# Patient Record
Sex: FEMALE | Race: WHITE | NOT HISPANIC OR LATINO | Employment: FULL TIME | URBAN - METROPOLITAN AREA
[De-identification: names, ages, dates, MRNs, and addresses within clinical notes are randomized per-mention and may not be internally consistent; named-entity substitution may affect disease eponyms.]

---

## 2017-01-05 ENCOUNTER — HOSPITAL ENCOUNTER (EMERGENCY)
Facility: MEDICAL CENTER | Age: 52
End: 2017-01-05
Attending: EMERGENCY MEDICINE
Payer: OTHER MISCELLANEOUS

## 2017-01-05 ENCOUNTER — APPOINTMENT (OUTPATIENT)
Dept: RADIOLOGY | Facility: MEDICAL CENTER | Age: 52
End: 2017-01-05
Attending: EMERGENCY MEDICINE
Payer: OTHER MISCELLANEOUS

## 2017-01-05 VITALS
SYSTOLIC BLOOD PRESSURE: 145 MMHG | HEART RATE: 91 BPM | TEMPERATURE: 97.4 F | WEIGHT: 152.56 LBS | RESPIRATION RATE: 16 BRPM | OXYGEN SATURATION: 97 % | DIASTOLIC BLOOD PRESSURE: 89 MMHG

## 2017-01-05 DIAGNOSIS — S20.211A CHEST WALL CONTUSION, RIGHT, INITIAL ENCOUNTER: ICD-10-CM

## 2017-01-05 PROCEDURE — 700111 HCHG RX REV CODE 636 W/ 250 OVERRIDE (IP): Performed by: EMERGENCY MEDICINE

## 2017-01-05 PROCEDURE — 96372 THER/PROPH/DIAG INJ SC/IM: CPT

## 2017-01-05 PROCEDURE — 99284 EMERGENCY DEPT VISIT MOD MDM: CPT

## 2017-01-05 PROCEDURE — 71020 DX-CHEST-2 VIEWS: CPT

## 2017-01-05 RX ORDER — KETOROLAC TROMETHAMINE 30 MG/ML
30 INJECTION, SOLUTION INTRAMUSCULAR; INTRAVENOUS ONCE
Status: COMPLETED | OUTPATIENT
Start: 2017-01-05 | End: 2017-01-05

## 2017-01-05 RX ORDER — TRAMADOL HYDROCHLORIDE 50 MG/1
50 TABLET ORAL EVERY 4 HOURS PRN
Qty: 20 TAB | Refills: 0 | Status: SHIPPED | OUTPATIENT
Start: 2017-01-05

## 2017-01-05 RX ADMIN — KETOROLAC TROMETHAMINE 30 MG: 30 INJECTION, SOLUTION INTRAMUSCULAR; INTRAVENOUS at 12:49

## 2017-01-05 NOTE — ED AVS SNAPSHOT
1/5/2017          Bridgette Franco  [address] [address]    Dear Bridgette:    [facility] wants to ensure your discharge home is safe and you or your loved ones have had all your questions answered regarding your care after you leave the hospital.    You may receive a telephone call within two days of your discharge.  This call is to make certain you understand your discharge instructions as well as ensure we provided you with the best care possible during your stay with us.     The call will only last approximately 3-5 minutes and will be done by a nurse.    Once again, we want to ensure your discharge home is safe and that you have a clear understanding of any next steps in your care.  If you have any questions or concerns, please do not hesitate to contact us, we are here for you.  Thank you for choosing [facility] for your healthcare needs.    Sincerely,    Bhavesh Graff    [facility]

## 2017-01-05 NOTE — ED NOTES
Discharge instructions given. Verbalizes understanding. Left with all belongings. Walked to ALDO lopez.

## 2017-01-05 NOTE — ED NOTES
Pt reports fall on Monday. Pt reports continued soreness to right sided ribs. Pt reports her right ankle had given out. Pt denies taking any medications. Pt in NAD. VSS

## 2017-01-05 NOTE — ED AVS SNAPSHOT
Tackle Grab Access Code: [email or web address]  Expires: 2/4/2017  1:42 PM    Tackle Grab  A secure, online tool to manage your health information     [employer]’s Tackle Grab® is a secure, online tool that connects you to your personalized health information from the privacy of your home -- day or night - making it very easy for you to manage your healthcare. Once the activation process is completed, you can even access your medical information using the Tackle Grab oliva, which is available for free in the Apple Oliva store or Google Play store.     Tackle Grab provides the following levels of access (as shown below):   My Chart Features   [facility] Primary Care Doctor [facility]  Specialists [facility]  Urgent  Care Non-[facility]  Primary Care  Doctor   Email your healthcare team securely and privately 24/7 X X X X   Manage appointments: schedule your next appointment; view details of past/upcoming appointments X      Request prescription refills. X      View recent personal medical records, including lab and immunizations X X X X   View health record, including health history, allergies, medications X X X X   Read reports about your outpatient visits, procedures, consult and ER notes X X X X   See your discharge summary, which is a recap of your hospital and/or ER visit that includes your diagnosis, lab results, and care plan. X X       How to register for Tackle Grab:  1. Go to  [email or web address].  2. Click on the Sign Up Now box, which takes you to the New Member Sign Up page. You will need to provide the following information:  a. Enter your Tackle Grab Access Code exactly as it appears at the top of this page. (You will not need to use this code after you’ve completed the sign-up process. If you do not sign up before the expiration date, you must request a new code.)   b. Enter your date of birth.   c. Enter your home email address.   d. Click Submit, and follow the next screen’s instructions.  3. Create a Tackle Grab ID. This will be your Tackle Grab  login ID and cannot be changed, so think of one that is secure and easy to remember.  4. Create a Lollipuff password. You can change your password at any time.  5. Enter your Password Reset Question and Answer. This can be used at a later time if you forget your password.   6. Enter your e-mail address. This allows you to receive e-mail notifications when new information is available in Lollipuff.  7. Click Sign Up. You can now view your health information.    For assistance activating your Lollipuff account, call [phone]

## 2017-01-05 NOTE — ED PROVIDER NOTES
ED Provider Note    Scribed for Domingo Villarreal M.D. by Loree Damon. 1/5/2017  12:23 PM    Primary care provider: No primary care provider  Means of arrival: Private vehicle  History obtained from: Patient  History limited by: None    CHIEF COMPLAINT  Chief Complaint   Patient presents with   • T-5000 FALL     HPI  Bridgette Franco is a 51 y.o. female who presents to the Emergency Department for right sided rib pain which occurred on 1/2 after the patient's ankle gave out on her and she fell on the curb.  The patient states that her ribs hurt below her right breast and the pain worsens with deep inspiration and expiration.  The patient states that the pain is so great it causes her to not be able to smoke her normal amount of cigarettes a day. The patient denies any difficulty walking or moving. The patient denies abdominal pain.     REVIEW OF SYSTEMS  Pertinent negatives include no abdominal pain.      PAST MEDICAL HISTORY    No chronic medical problems    SURGICAL HISTORY  patient denies any surgical history    SOCIAL HISTORY  Social History   Substance Use Topics   • Smoking status: Half a pack    • Smokeless tobacco: No   • Alcohol Use: No      History   Drug Use No     FAMILY HISTORY  No family history noted    CURRENT MEDICATIONS  No current facility-administered medications on file prior to encounter.     No current outpatient prescriptions on file prior to encounter.     ALLERGIES  No Known Allergies    PHYSICAL EXAM  VITAL SIGNS: /98 mmHg  Pulse 95  Temp(Src) 36.3 °C (97.4 °F)  Resp 16  Wt 69.2 kg (152 lb 8.9 oz)  SpO2 95%    Constitutional: Well developed, Well nourished, No acute distress, Non-toxic appearance. Tearful   HENT: Normocephalic, Atraumatic, Bilateral external ears normal, Oropharynx is clear mucous membranes are moist. No oral exudates or nasal discharge.   Eyes: Pupils are equal round and reactive, EOMI, Conjunctiva normal, No discharge.   Neck: Normal range of  motion, No tenderness, Supple, No stridor. No meningismus.  Lymphatic: No lymphadenopathy noted.   Cardiovascular: Regular rate and rhythm without murmur rub or gallop.  Thorax & Lungs: Clear breath sounds bilaterally without wheezes, rhonchi or rales. Tenderness right sided T7 and T8 ribs from anterior axillary line to the sternum   Abdomen: Soft non-tender non-distended. There is no rebound or guarding. No organomegaly is appreciated. Bowel sounds are normal.  Skin: Normal without rash.  Abrasions to the right knee  Back: No CVA or spinal tenderness.   Extremities: Intact distal pulses, No edema, No tenderness, No cyanosis, No clubbing. Capillary refill is less than 2 seconds.  Musculoskeletal: Good range of motion in all major joints. No tenderness to palpation or major deformities noted.   Neurologic: Alert & oriented x 3, Normal motor function, Normal sensory function, No focal deficits noted. Reflexes are normal.  Psychiatric: Affect normal, Judgment normal, Mood normal. There is no suicidal ideation or patient reported hallucinations.    RADIOLOGY  DX-CHEST-2 VIEWS   Final Result      No active cardiopulmonary disease.        The radiologist's interpretation of all radiological studies have been reviewed by me.    COURSE & MEDICAL DECISION MAKING  Nursing notes, VS, PMSFHx reviewed in chart.    12:23 PM Patient seen and examined at bedside. Ordered for chest xray to evaluate. I was concerned about pneumothorax versus chest contusion versus rib fractures. Patient was treated with Toradol 30mg injection for her symptoms. I spent approximately five minutes with the patient explaining the importance of smoking cessation.      1:30 PM- Re-examined; The patient is resting in bed comfortably. I discussed her above findings and plans for discharge with a prescription for Ultram to be used as needed for discomfort along with ibuprofen. She was given a referral to follow up with her primary care provider and instructed  to return to the ED if her symptoms worsen. Patient understands and agrees. Her vitals prior to discharge are: /98 mmHg  Pulse 95  Temp(Src) 36.3 °C (97.4 °F)  Resp 16  Wt 69.2 kg (152 lb 8.9 oz)  SpO2 95%    Patient has had high blood pressure while in the emergency department, felt likely secondary to medical condition. Counseled patient to monitor blood pressure at home and follow up with primary care physician.      I have signed into and reviewed the patient's prescription monitoring program data prior to prescribing a scheduled drug. The patient will not drink alcohol nor drive with prescribed medications. The patient will return for new or worsening symptoms and is stable at the time of discharge.    DISPOSITION:  Patient will be discharged home in stable condition.    FINAL IMPRESSION  1. Chest wall contusion, right, initial encounter          Loree SMITH (Andrew), am scribing for, and in the presence of, Domingo Villarreal M.D..    Electronically signed by: Loree Carlos), 1/5/2017    IDomingo M.D. personally performed the services described in this documentation, as scribed by Loree Damon in my presence, and it is both accurate and complete.    The note accurately reflects work and decisions made by me.  Domingo Villarreal  1/5/2017  1:39 PM

## 2017-01-05 NOTE — ED AVS SNAPSHOT
After Visit Summary                                                                                                                Bridgette Franco   MRN: [id number]    Department:  [facility], Emergency Dept   Date of Visit:  1/5/2017            [facility], Emergency Dept    [address]    [place] [address]    Phone:  [phone]      You were seen by     Domingo Villarreal M.D.      Your Diagnosis Was     Chest wall contusion, right, initial encounter     S20.211A       These are the medications you received during your hospitalization from 01/05/2017 1051 to 01/05/2017 1343     Date/Time Order Dose Route Action    01/05/2017 1249 ketorolac (TORADOL) injection 30 mg 30 mg Intramuscular Given      Medication Information     Review all of your home medications and newly ordered medications with your primary doctor and/or pharmacist as soon as possible. Follow medication instructions as directed by your doctor and/or pharmacist.     Please keep your complete medication list with you and share with your physician. Update the information when medications are discontinued, doses are changed, or new medications (including over-the-counter products) are added; and carry medication information at all times in the event of emergency situations.               Medication List      START taking these medications        Instructions    tramadol 50 MG Tabs   Commonly known as:  ULTRAM    Take 1 Tab by mouth every four hours as needed.   Dose:  50 mg               Procedures and tests performed during your visit     DX-CHEST-2 VIEWS        Discharge Instructions       Blunt Chest Trauma  Blunt chest trauma is an injury caused by a blow to the chest. These chest injuries can be very painful. Blunt chest trauma often results in bruised or broken (fractured) ribs. Most cases of bruised and fractured ribs from blunt chest traumas get better after 1 to 3 weeks of rest and pain medicine. Often,  the soft tissue in the chest wall is also injured, causing pain and bruising. Internal organs, such as the heart and lungs, may also be injured. Blunt chest trauma can lead to serious medical problems. This injury requires immediate medical care.  CAUSES   · Motor vehicle collisions.  · Falls.  · Physical violence.  · Sports injuries.  SYMPTOMS   · Chest pain. The pain may be worse when you move or breathe deeply.  · Shortness of breath.  · Lightheadedness.  · Bruising.  · Tenderness.  · Swelling.  DIAGNOSIS   Your caregiver will do a physical exam. X-rays may be taken to look for fractures. However, minor rib fractures may not show up on X-rays until a few days after the injury. If a more serious injury is suspected, further imaging tests may be done. This may include ultrasounds, computed tomography (CT) scans, or magnetic resonance imaging (MRI).  TREATMENT   Treatment depends on the severity of your injury. Your caregiver may prescribe pain medicines and deep breathing exercises.  HOME CARE INSTRUCTIONS  · Limit your activities until you can move around without much pain.  · Do not do any strenuous work until your injury is healed.  · Put ice on the injured area.  ¨ Put ice in a plastic bag.  ¨ Place a towel between your skin and the bag.  ¨ Leave the ice on for 15-20 minutes, 03-04 times a day.  · You may wear a rib belt as directed by your caregiver to reduce pain.  · Practice deep breathing as directed by your caregiver to keep your lungs clear.  · Only take over-the-counter or prescription medicines for pain, fever, or discomfort as directed by your caregiver.  SEEK IMMEDIATE MEDICAL CARE IF:   · You have increasing pain or shortness of breath.  · You cough up blood.  · You have nausea, vomiting, or abdominal pain.  · You have a fever.  · You feel dizzy, weak, or you faint.  MAKE SURE YOU:  · Understand these instructions.  · Will watch your condition.  · Will get help right away if you are not doing well or  get worse.     This information is not intended to replace advice given to you by your health care provider. Make sure you discuss any questions you have with your health care provider.     Document Released: 01/25/2006 Document Revised: 01/08/2016 Document Reviewed: 10/03/2012  Elsevier Interactive Patient Education ©2016 [employer]            Patient Information     Patient Information    Following emergency treatment: all patient requiring follow-up care must return either to a private physician or a clinic if your condition worsens before you are able to obtain further medical attention, please return to the emergency room.     Billing Information    At [facility], we work to make the billing process streamlined for our patients.  Our Representatives are here to answer any questions you may have regarding your hospital bill.  If you have insurance coverage and have supplied your insurance information to us, we will submit a claim to your insurer on your behalf.  Should you have any questions regarding your bill, we can be reached online or by phone as follows:  Online: You are able pay your bills online or live chat with our representatives about any billing questions you may have. We are here to help Monday - Friday from 8:00am to 7:30pm and 9:00am - 12:00pm on Saturdays.  Please visit [email or web address]  for more information.   Phone:  [phone] or [phone]    Please note that your emergency physician, surgeon, pathologist, radiologist, anesthesiologist, and other specialists are not employed by [facility] and will therefore bill separately for their services.  Please contact them directly for any questions concerning their bills at the numbers below:     Emergency Physician Services:  [phone]  [facility]:  [phone]  [facility]:  [phone]  [facility]:  [phone]    1. Your final bill may vary from  the amount quoted upon discharge if all procedures are not complete at that time, or if your doctor has additional procedures of which we are not aware. You will receive an additional bill if you return to the Emergency Department at [facility] for suture removal regardless of the facility of which the sutures were placed.     2. Please arrange for settlement of this account at the emergency registration.    3. All self-pay accounts are due in full at the time of treatment.  If you are unable to meet this obligation then payment is expected within 4-5 days.     4. If you have had radiology studies (CT, X-ray, Ultrasound, MRI), you have received a preliminary result during your emergency department visit. Please contact the radiology department [phone] to receive a copy of your final result. Please discuss the Final result with your primary physician or with the follow up physician provided.     Crisis Hotline:  [place] Crisis Hotline:  5-488-EJTQZXM or 1-342.114.2215  [place] Crisis Hotline:    1-503.877.2868 or 034-205-7146         ED Discharge Follow Up Questions    1. In order to provide you with very good care, we would like to follow up with a phone call in the next few days.  May we have your permission to contact you?     YES /  NO    2. What is the best phone number to call you? (       )_____-__________    3. What is the best time to call you?      Morning  /  Afternoon  /  Evening                   Patient Signature:  ____________________________________________________________    Date:  ____________________________________________________________

## 2017-01-05 NOTE — DISCHARGE INSTRUCTIONS
Blunt Chest Trauma  Blunt chest trauma is an injury caused by a blow to the chest. These chest injuries can be very painful. Blunt chest trauma often results in bruised or broken (fractured) ribs. Most cases of bruised and fractured ribs from blunt chest traumas get better after 1 to 3 weeks of rest and pain medicine. Often, the soft tissue in the chest wall is also injured, causing pain and bruising. Internal organs, such as the heart and lungs, may also be injured. Blunt chest trauma can lead to serious medical problems. This injury requires immediate medical care.  CAUSES   · Motor vehicle collisions.  · Falls.  · Physical violence.  · Sports injuries.  SYMPTOMS   · Chest pain. The pain may be worse when you move or breathe deeply.  · Shortness of breath.  · Lightheadedness.  · Bruising.  · Tenderness.  · Swelling.  DIAGNOSIS   Your caregiver will do a physical exam. X-rays may be taken to look for fractures. However, minor rib fractures may not show up on X-rays until a few days after the injury. If a more serious injury is suspected, further imaging tests may be done. This may include ultrasounds, computed tomography (CT) scans, or magnetic resonance imaging (MRI).  TREATMENT   Treatment depends on the severity of your injury. Your caregiver may prescribe pain medicines and deep breathing exercises.  HOME CARE INSTRUCTIONS  · Limit your activities until you can move around without much pain.  · Do not do any strenuous work until your injury is healed.  · Put ice on the injured area.  ¨ Put ice in a plastic bag.  ¨ Place a towel between your skin and the bag.  ¨ Leave the ice on for 15-20 minutes, 03-04 times a day.  · You may wear a rib belt as directed by your caregiver to reduce pain.  · Practice deep breathing as directed by your caregiver to keep your lungs clear.  · Only take over-the-counter or prescription medicines for pain, fever, or discomfort as directed by your caregiver.  SEEK IMMEDIATE MEDICAL  CARE IF:   · You have increasing pain or shortness of breath.  · You cough up blood.  · You have nausea, vomiting, or abdominal pain.  · You have a fever.  · You feel dizzy, weak, or you faint.  MAKE SURE YOU:  · Understand these instructions.  · Will watch your condition.  · Will get help right away if you are not doing well or get worse.     This information is not intended to replace advice given to you by your health care provider. Make sure you discuss any questions you have with your health care provider.     Document Released: 01/25/2006 Document Revised: 01/08/2016 Document Reviewed: 10/03/2012  [employer] Interactive Patient Education ©2016 [employer]